# Patient Record
Sex: FEMALE | URBAN - METROPOLITAN AREA
[De-identification: names, ages, dates, MRNs, and addresses within clinical notes are randomized per-mention and may not be internally consistent; named-entity substitution may affect disease eponyms.]

---

## 2018-01-16 ENCOUNTER — TELEPHONE (OUTPATIENT)
Dept: GASTROENTEROLOGY | Age: 68
End: 2018-01-16

## 2018-01-16 NOTE — TELEPHONE ENCOUNTER
Received a fax from [facility] for this patient, the fax was address to . I called 's office to verify that this patient is being seen in his [email or web address] I spoke with Clement Torres and she confirmed that this patient does have an appt in May. I will forward the fax to premier bernardo ('s office) at [phone].